# Patient Record
Sex: FEMALE
[De-identification: names, ages, dates, MRNs, and addresses within clinical notes are randomized per-mention and may not be internally consistent; named-entity substitution may affect disease eponyms.]

---

## 2022-06-28 ENCOUNTER — NURSE TRIAGE (OUTPATIENT)
Dept: OTHER | Facility: CLINIC | Age: 47
End: 2022-06-28

## 2022-06-28 NOTE — TELEPHONE ENCOUNTER
Subjective: Caller states \"I woke up early this morning and I was dizzy and nausea and I was sweating real bad. You google stuff you know and it said I might have a heart attack. I know the alarm went off and I jumped up fast.\"     Current Symptoms: denies chest pain. Denies any s/s at this time. Denies dizziness at this time. States felt like she was going to fall over but no spinning sensation. Going from sitting to standing. States stays hydrated. Onset: this morning lasted 15 minutes. No prior episodes. Associated Symptoms: nausea without emesis. Denies vision changes    Pain Severity: 0/10; ;     Temperature: denies states took temp and it was normal    What has been tried: googled s/s    LMP: irregular-pre-menopausal Pregnant: No    Recommended disposition: See PCP within 3 Days    Care advice provided, patient verbalizes understanding; denies any other questions or concerns; instructed to call back for any new or worsening symptoms. Patient/caller agrees to follow-up with PCP states already has an appt for tomorrow. This triage is a result of a call to [facility]. Please do not respond to the triage nurse through this encounter. Any subsequent communication should be directly with the patient.         Reason for Disposition   [1] MILD dizziness (e.g., walking normally) AND [2] has NOT been evaluated by physician for this  (Exception: dizziness caused by heat exposure, sudden standing, or poor fluid intake)    Protocols used: [facility]